# Patient Record
Sex: MALE | URBAN - METROPOLITAN AREA
[De-identification: names, ages, dates, MRNs, and addresses within clinical notes are randomized per-mention and may not be internally consistent; named-entity substitution may affect disease eponyms.]

---

## 2023-02-21 ENCOUNTER — APPOINTMENT (OUTPATIENT)
Dept: URBAN - METROPOLITAN AREA CLINIC 280 | Age: 63
Setting detail: DERMATOLOGY
End: 2023-02-21

## 2023-02-21 DIAGNOSIS — L85.3 XEROSIS CUTIS: ICD-10-CM

## 2023-02-21 DIAGNOSIS — L57.8 OTHER SKIN CHANGES DUE TO CHRONIC EXPOSURE TO NONIONIZING RADIATION: ICD-10-CM

## 2023-02-21 DIAGNOSIS — L57.0 ACTINIC KERATOSIS: ICD-10-CM

## 2023-02-21 DIAGNOSIS — L28.1 PRURIGO NODULARIS: ICD-10-CM

## 2023-02-21 PROBLEM — D23.5 OTHER BENIGN NEOPLASM OF SKIN OF TRUNK: Status: ACTIVE | Noted: 2023-02-21

## 2023-02-21 PROCEDURE — 17000 DESTRUCT PREMALG LESION: CPT

## 2023-02-21 PROCEDURE — OTHER PRESCRIPTION: OTHER

## 2023-02-21 PROCEDURE — OTHER MIPS QUALITY: OTHER

## 2023-02-21 PROCEDURE — 17003 DESTRUCT PREMALG LES 2-14: CPT

## 2023-02-21 PROCEDURE — OTHER LIQUID NITROGEN: OTHER

## 2023-02-21 PROCEDURE — OTHER COUNSELING: OTHER

## 2023-02-21 PROCEDURE — 99204 OFFICE O/P NEW MOD 45 MIN: CPT | Mod: 25

## 2023-02-21 RX ORDER — TRIAMCINOLONE ACETONIDE 1 MG/G
CREAM TOPICAL BID
Qty: 80 | Refills: 3 | Status: ERX | COMMUNITY
Start: 2023-02-21

## 2023-02-21 ASSESSMENT — LOCATION ZONE DERM
LOCATION ZONE: ARM
LOCATION ZONE: SCALP
LOCATION ZONE: FACE

## 2023-02-21 ASSESSMENT — LOCATION DETAILED DESCRIPTION DERM
LOCATION DETAILED: LEFT PROXIMAL DORSAL FOREARM
LOCATION DETAILED: LEFT SUPERIOR MEDIAL BUCCAL CHEEK
LOCATION DETAILED: RIGHT MEDIAL FRONTAL SCALP
LOCATION DETAILED: LEFT OCCIPITAL SCALP
LOCATION DETAILED: MID-OCCIPITAL SCALP
LOCATION DETAILED: LEFT MEDIAL FRONTAL SCALP
LOCATION DETAILED: RIGHT PROXIMAL DORSAL FOREARM
LOCATION DETAILED: LEFT INFERIOR PARIETAL SCALP
LOCATION DETAILED: LEFT CENTRAL FRONTAL SCALP
LOCATION DETAILED: SUPERIOR MID FOREHEAD

## 2023-02-21 ASSESSMENT — LOCATION SIMPLE DESCRIPTION DERM
LOCATION SIMPLE: RIGHT FOREARM
LOCATION SIMPLE: LEFT CHEEK
LOCATION SIMPLE: LEFT FOREARM
LOCATION SIMPLE: LEFT SCALP
LOCATION SIMPLE: RIGHT SCALP
LOCATION SIMPLE: SCALP
LOCATION SIMPLE: POSTERIOR SCALP
LOCATION SIMPLE: SUPERIOR FOREHEAD

## 2025-07-06 ENCOUNTER — RESULTS FOLLOW-UP (OUTPATIENT)
Dept: URGENT CARE | Facility: CLINIC | Age: 65
End: 2025-07-06

## 2025-07-06 ENCOUNTER — APPOINTMENT (OUTPATIENT)
Dept: CARDIOLOGY | Facility: CLINIC | Age: 65
End: 2025-07-06
Payer: COMMERCIAL

## 2025-07-06 ENCOUNTER — OFFICE VISIT (OUTPATIENT)
Dept: URGENT CARE | Facility: CLINIC | Age: 65
End: 2025-07-06
Payer: COMMERCIAL

## 2025-07-06 ENCOUNTER — LAB (OUTPATIENT)
Dept: LAB | Facility: CLINIC | Age: 65
End: 2025-07-06
Payer: COMMERCIAL

## 2025-07-06 VITALS
OXYGEN SATURATION: 96 % | TEMPERATURE: 97.6 F | HEART RATE: 78 BPM | RESPIRATION RATE: 14 BRPM | SYSTOLIC BLOOD PRESSURE: 150 MMHG | WEIGHT: 195 LBS | DIASTOLIC BLOOD PRESSURE: 90 MMHG

## 2025-07-06 DIAGNOSIS — R00.0 INCREASED HEART RATE: Primary | ICD-10-CM

## 2025-07-06 LAB
ALBUMIN SERPL-MCNC: 4.2 G/DL (ref 3.5–5.3)
ALP SERPL-CCNC: 72 U/L (ref 45–115)
ALT SERPL-CCNC: 27 U/L (ref 7–52)
ANION GAP SERPL CALC-SCNC: 6 MMOL/L (ref 3–11)
AST SERPL-CCNC: 19 U/L
BASOPHILS # BLD AUTO: 0.06 10*3/UL
BASOPHILS NFR BLD AUTO: 0.9 % (ref 0–2)
BILIRUB SERPL-MCNC: 0.63 MG/DL (ref 0.2–1.4)
BUN SERPL-MCNC: 18 MG/DL (ref 7–25)
CALCIUM ALBUM COR SERPL-MCNC: 9.3 MG/DL (ref 8.6–10.3)
CALCIUM SERPL-MCNC: 9.5 MG/DL (ref 8.6–10.3)
CHLORIDE SERPL-SCNC: 105 MMOL/L (ref 98–107)
CO2 SERPL-SCNC: 29 MMOL/L (ref 21–32)
CREAT SERPL-MCNC: 1.12 MG/DL (ref 0.7–1.3)
EGFRCR SERPLBLD CKD-EPI 2021: 73 ML/MIN/1.73M*2
EOSINOPHIL # BLD AUTO: 0.09 10*3/UL
EOSINOPHIL NFR BLD AUTO: 1.4 % (ref 0–3)
ERYTHROCYTE DISTRIBUTION WIDTH STANDARD DEVIATION: 41.1 FL (ref 35.1–43.9)
ERYTHROCYTE [DISTWIDTH] IN BLOOD BY AUTOMATED COUNT: 12.4 % (ref 11.5–15)
GLUCOSE SERPL-MCNC: 104 MG/DL (ref 70–105)
HCT VFR BLD AUTO: 43.6 % (ref 38–50)
HGB BLD-MCNC: 14.5 G/DL (ref 13.2–17.2)
IMMATURE GRANULOCYTES (10*3/UL) LEUKOCYTES IN BLOOD BY AUTOMATED COUNT: <0.03 10*3/UL
IMMATURE GRANULOCYTES/100 LEUKOCYTES IN BLOOD BY AUTOMATED COUNT: 0.3 %
LYMPHOCYTES # BLD AUTO: 2.23 10*3/UL
LYMPHOCYTES NFR BLD AUTO: 35.2 % (ref 15–47)
MCH RBC QN AUTO: 30.5 PG (ref 29–34)
MCHC RBC AUTO-ENTMCNC: 33.3 G/DL (ref 32–36)
MCV RBC AUTO: 91.6 FL (ref 82–97)
MONOCYTES # BLD AUTO: 0.44 10*3/UL
MONOCYTES NFR BLD AUTO: 6.9 % (ref 5–13)
NEUTROPHILS # BLD AUTO: 3.5 10*3/UL
NEUTROPHILS NFR BLD AUTO: 55.3 % (ref 46–70)
NRBC (10*3/UL) BY AUTOMATED COUNT: 0 10*3/UL (ref 0–0.1)
NRBC BLD-RTO: 0 /100 WBCS (ref 0–2)
PLATELET # BLD AUTO: 260 10*3/UL (ref 130–350)
PMV BLD AUTO: 9 FL (ref 6.9–10.8)
POTASSIUM SERPL-SCNC: 4.2 MMOL/L (ref 3.5–5.1)
PROT SERPL-MCNC: 6.8 G/DL (ref 6–8.3)
RBC # BLD AUTO: 4.76 10*6/UL (ref 4.1–5.8)
SODIUM SERPL-SCNC: 140 MMOL/L (ref 135–145)
TSH SERPL DL<=0.05 MIU/L-ACNC: 1.1 UIU/ML (ref 0.34–4.82)
WBC # BLD AUTO: 6.3 10*3/UL (ref 3.7–9.6)

## 2025-07-06 PROCEDURE — 93010 ELECTROCARDIOGRAM REPORT: CPT | Performed by: INTERNAL MEDICINE

## 2025-07-06 PROCEDURE — 93005 ELECTROCARDIOGRAM TRACING: CPT | Performed by: FAMILY MEDICINE

## 2025-07-06 RX ORDER — SERTRALINE HYDROCHLORIDE 25 MG/1
25 TABLET, FILM COATED ORAL DAILY
COMMUNITY

## 2025-07-06 RX ORDER — CLONAZEPAM 0.5 MG/1
TABLET ORAL
COMMUNITY
Start: 2025-06-30

## 2025-07-06 RX ORDER — TAMSULOSIN HYDROCHLORIDE 0.4 MG/1
0.4 CAPSULE ORAL DAILY
COMMUNITY
End: 2025-07-06 | Stop reason: ALTCHOICE

## 2025-07-06 RX ORDER — ATORVASTATIN CALCIUM 20 MG/1
20 TABLET, FILM COATED ORAL DAILY
COMMUNITY

## 2025-07-06 RX ORDER — CLINDAMYCIN PHOSPHATE 10 UG/ML
1 LOTION TOPICAL DAILY
COMMUNITY
Start: 2025-04-02 | End: 2025-07-06 | Stop reason: ALTCHOICE

## 2025-07-06 RX ORDER — CIPROFLOXACIN 500 MG/1
TABLET, FILM COATED ORAL
COMMUNITY
End: 2025-07-06 | Stop reason: ALTCHOICE

## 2025-07-06 RX ORDER — SILDENAFIL 50 MG/1
TABLET, FILM COATED ORAL
COMMUNITY
End: 2025-07-06 | Stop reason: ALTCHOICE

## 2025-07-06 RX ORDER — CETIRIZINE HYDROCHLORIDE 10 MG/1
10 TABLET ORAL DAILY
COMMUNITY

## 2025-07-06 ASSESSMENT — PAIN SCALES - GENERAL: PAINLEVEL_OUTOF10: 0-NO PAIN

## 2025-07-06 ASSESSMENT — ENCOUNTER SYMPTOMS
ABDOMINAL PAIN: 0
SINUS PAIN: 0
SHORTNESS OF BREATH: 0
SINUS PRESSURE: 0
NECK STIFFNESS: 0
APPETITE CHANGE: 0
NUMBNESS: 0
WEAKNESS: 0
CHOKING: 0
BACK PAIN: 0
FATIGUE: 0
FEVER: 0
HEADACHES: 0
ARTHRALGIAS: 0
TROUBLE SWALLOWING: 0
CHEST TIGHTNESS: 0
MYALGIAS: 0
SORE THROAT: 0
NAUSEA: 0
NECK PAIN: 0
DIARRHEA: 0
DIZZINESS: 1
CHILLS: 0
COUGH: 0
VOMITING: 0

## 2025-07-06 NOTE — PATIENT INSTRUCTIONS
Diagnosed with episode of dizziness    EKG is normal today.  Sinus rhythm.    Complete blood count is normal today.  This is looking at size and shape of red cells and white blood cells.  No anemia.    Comprehensive metabolic panel normal.  No electrolyte imbalance.  Liver and kidney function is normal.    TSH this is pending and I will make you aware of the result when it becomes available    Please recall discussion regarding hydration, electrolyte supplement, hypoglycemia.  Advise use of Gatorade and keeping a high carbohydrate snack close at hand.    Please follow-up with primary care provider regarding your visit to urgent care today.

## 2025-07-06 NOTE — PROGRESS NOTES
Subjective   Chief Complaint   Patient presents with    Dizziness     Patient is in today with c/o dizziness and racing heart yesterday. Today still has racing pulse.         History of Present Illness  Rich Crain is a 64 year old male who presents with dizziness and shaking after traveling.    He experiences dizziness and a sensation of the building moving after arriving at an [employer]nb in [place] following a drive from [place]. This occurs immediately after sitting down and is accompanied by shaking.     His son suggests dehydration as a possible cause. He consumes a 16-ounce bottle of water in the car, another with dinner, and a significant amount of coffee in the morning. He also walks around a rest area in the sun. After drinking several glasses of water with some salt, the dizziness and shaking subside, and the sensation of the house moving resolves.    He denies chest pain, chest tightness, nausea, vomiting, ear fullness, or ear pressure. Quick head movements do not provoke dizziness. He does not have diabetes and eats a regular meal during the day.       The following have been reviewed and updated as appropriate in this visit:          Allergies   Allergen Reactions    Levofloxacin Other (see comments)     Current Outpatient Medications   Medication Sig Dispense Refill    atorvastatin (LIPITOR) 20 mg tablet Take 1 tablet (20 mg total) by mouth daily      clonazePAM (KlonoPIN) 0.5 mg tablet TAKE ONE TABLET BY MOUTH AT BED AS NEEDED FOR SLEEP      sertraline (ZOLOFT) 25 mg tablet Take 1 tablet (25 mg total) by mouth daily      cetirizine (ZyrTEC) 10 mg tablet Take 1 tablet (10 mg total) by mouth daily       No current facility-administered medications for this visit.     No past medical history on file.    Review of Systems   Constitutional:  Negative for appetite change, chills, fatigue and fever.   HENT:  Negative for congestion, ear pain, postnasal drip, sinus pressure, sinus pain, sore throat and  trouble swallowing.    Respiratory:  Negative for cough, choking, chest tightness and shortness of breath.    Cardiovascular:  Negative for chest pain.   Gastrointestinal:  Negative for abdominal pain, diarrhea, nausea and vomiting.   Musculoskeletal:  Negative for arthralgias, back pain, myalgias, neck pain and neck stiffness.   Neurological:  Positive for dizziness (1 day ago.  Not at present). Negative for syncope, weakness, numbness and headaches.       Objective   /90   Pulse 78   Temp 36.4 °C (97.6 °F) (Temporal)   Resp 14   Wt 88.5 kg (195 lb)   SpO2 96%     Physical Exam  Constitutional:       General: He is not in acute distress.     Appearance: Normal appearance. He is not ill-appearing or toxic-appearing.   HENT:      Right Ear: Tympanic membrane, ear canal and external ear normal. There is no impacted cerumen.      Left Ear: Tympanic membrane, ear canal and external ear normal. There is no impacted cerumen.   Cardiovascular:      Rate and Rhythm: Normal rate and regular rhythm.      Heart sounds: Normal heart sounds. No murmur heard.     No friction rub. No gallop.   Pulmonary:      Effort: Pulmonary effort is normal. No respiratory distress.      Breath sounds: Normal breath sounds. No stridor. No wheezing, rhonchi or rales.   Musculoskeletal:      Cervical back: Normal range of motion and neck supple. No rigidity or tenderness.   Lymphadenopathy:      Cervical: No cervical adenopathy.   Skin:     General: Skin is warm and dry.   Neurological:      General: No focal deficit present.      Mental Status: He is alert and oriented to person, place, and time.   Psychiatric:         Mood and Affect: Mood normal.         Behavior: Behavior normal.         No results found for this or any previous visit (from the past 24 hours).    Results  DIAGNOSTIC  EKG: Normal (07/06/2025)       Assessment/Plan   Diagnoses and all orders for this visit:    Increased heart rate  -     ECG 12 lead      Assessment &  Plan  Sensation of heart racing dizziness  Symptoms likely due to dehydration and possible hypoglycemia. Normal EKG. No chest pain, nausea, or vomiting. Elevation change may have contributed to dizziness.  - Order CBC and metabolic panel to assess for anemia and electrolyte levels.  - Advise drinking Gatorade or similar electrolyte solutions to prevent dehydration.  Keep high carbohydrate snack close at hand for potential hypoglycemia.  - Educate on monitoring heart rate and symptoms.  - Instruct to call EMS if experiencing chest pain, tightness, shortness of breath, nausea, or vomiting.    Anxiety  Anxiety may contribute to heart racing sensation.  No acute symptoms reported.    General Health Maintenance  Emphasized importance of hydration and electrolyte balance during travel and physical activity.  - Advise regular hydration, especially when traveling or engaging in physical activity.  - Recommend carrying electrolyte solutions and snacks to prevent dehydration and hypoglycemia.    Follow-up with primary care provider.  Provided directions of when to return to urgent care versus the emergency department.  Patient acknowledged understanding and acceptance for the care plan.       There are no Patient Instructions on file for this visit.    Rosa Lopez, CNP